# Patient Record
Sex: MALE | Race: OTHER
[De-identification: names, ages, dates, MRNs, and addresses within clinical notes are randomized per-mention and may not be internally consistent; named-entity substitution may affect disease eponyms.]

---

## 2022-10-02 ENCOUNTER — HOSPITAL ENCOUNTER (INPATIENT)
Dept: HOSPITAL 93 - ER | Age: 82
LOS: 9 days | Discharge: HOME | DRG: 202 | End: 2022-10-11
Attending: INTERNAL MEDICINE | Admitting: INTERNAL MEDICINE
Payer: COMMERCIAL

## 2022-10-02 VITALS — HEIGHT: 62 IN | BODY MASS INDEX: 27.23 KG/M2 | WEIGHT: 148 LBS

## 2022-10-02 DIAGNOSIS — I50.33: ICD-10-CM

## 2022-10-02 DIAGNOSIS — R06.02: ICD-10-CM

## 2022-10-02 DIAGNOSIS — I16.0: ICD-10-CM

## 2022-10-02 DIAGNOSIS — I11.0: ICD-10-CM

## 2022-10-02 DIAGNOSIS — R09.02: ICD-10-CM

## 2022-10-02 DIAGNOSIS — J44.0: ICD-10-CM

## 2022-10-02 DIAGNOSIS — J20.9: Primary | ICD-10-CM

## 2022-10-02 NOTE — NUR
PTE ALERTA Y ORIENTADO EN KATHRYN LUCY ESFERAS, LLEGA AMBULANDO EN COMPANIA DE
FAMILIAR Y REFIEREN PRESION ARTERIAL ELEVADA Y FALTA DE AIRE. AL MOMENTO B/P
MANUAL 158/80 Y SATURANDO 97%. SE UBICA EN CYDNEY DE ESPERA.

## 2022-10-02 NOTE — NUR
SE RECIBE PACIENTE A UNIDAD DE CHEST PAIN UNIT. ESTA ALERTA Y ORIENTADO X3. EN
H/P MANO DERECHA #20 Y MANO IZQUIERDA #22 JLOIE DE EDEMA, INFILTRACION Y
ENROJECIMIENTO. CAZARES COLOCADO A GRAVEDAD PRESENTANDO ORINA COLOR AGUA 1000 ML.
CANULA NASAL A 3 L/MIN. SE COLOCA TRIDIL A 3ML/HR Y SE ORIENTA PACIENTE SOBRE
LA UNIDAD Y MANAN REFIERE ENTENDER.

## 2022-10-03 PROCEDURE — B24BZZZ ULTRASONOGRAPHY OF HEART WITH AORTA: ICD-10-PCS | Performed by: GENERAL PRACTICE

## 2022-10-03 PROCEDURE — 4A12X4Z MONITORING OF CARDIAC ELECTRICAL ACTIVITY, EXTERNAL APPROACH: ICD-10-PCS | Performed by: INTERNAL MEDICINE

## 2022-10-07 PROCEDURE — BW24ZZZ COMPUTERIZED TOMOGRAPHY (CT SCAN) OF CHEST AND ABDOMEN: ICD-10-PCS | Performed by: INTERNAL MEDICINE

## 2025-06-05 ENCOUNTER — HOSPITAL ENCOUNTER (EMERGENCY)
Dept: HOSPITAL 93 - ER | Age: 85
Discharge: HOME | End: 2025-06-05
Payer: COMMERCIAL

## 2025-06-05 VITALS — HEIGHT: 62 IN | WEIGHT: 150 LBS | BODY MASS INDEX: 27.6 KG/M2

## 2025-06-05 DIAGNOSIS — R42: Primary | ICD-10-CM

## 2025-06-05 DIAGNOSIS — E78.00: ICD-10-CM

## 2025-06-05 DIAGNOSIS — I10: ICD-10-CM

## 2025-06-05 DIAGNOSIS — J45.909: ICD-10-CM

## 2025-06-05 LAB
APPEARANCE UR: CLEAR
BACTERIA UR QL AUTO: (no result)
BACTERIA UR QL AUTO: 2.4 UL (ref 0–1933)
BASOPHILS NFR BLD AUTO: 0.8 % (ref 0.1–1.2)
BASOPHILS NFR BLD: (no result) %
BILIRUB UR QL STRIP: NEGATIVE
BLASTS NFR BLD: (no result) %
CASTS # UR AUTO: 0 UL (ref 0–1.4)
COLOR UR: YELLOW
CRYSTALS UR QL: (no result) /HPF
EOSINOPHIL # BLD AUTO: 0.47 10*3/UL (ref 0.04–0.54)
EOSINOPHIL NFR BLD AUTO: 9.8 % (ref 0.7–7)
EOSINOPHIL NFR BLD: (no result) %
EPI CELLS URNS QL MICRO: (no result) /HPF
EPI CELLS URNS QL MICRO: 0.6 UL (ref 0–38.8)
ERYTHROCYTE [DISTWIDTH] IN BLOOD BY AUTOMATED COUNT: 14.5 % (ref 11.6–14.4)
GLUCOSE UR STRIP-MCNC: >=1000 MG/DL
HCT VFR BLD AUTO: 54.9 % (ref 40.1–51)
HGB BLD-MCNC: 18.3 G/DL (ref 13.7–17.5)
KETONES UR QL: NEGATIVE
LEUKOCYTE ESTERASE UR QL STRIP: NEGATIVE
LYMPHOCYTES # BLD AUTO: 1.2 10*3/UL (ref 1.18–3.74)
LYMPHOCYTES NFR BLD AUTO: 24.9 % (ref 19.3–53.1)
MCH RBC QN AUTO: 27.8 PG (ref 25.6–32.2)
METAMYELOCYTES NFR BLD: (no result) %
MONOCYTES # BLD AUTO: 0.58 10*3/UL (ref 0.24–0.82)
MONOCYTES NFR BLD AUTO: 12 % (ref 4.7–12.5)
MONOCYTES NFR BLD: (no result) %
MUCOUS THREADS #/AREA URNS LPF: (no result) /[LPF]
MYELOCYTES NFR BLD: (no result) %
NEUTROPHILS # BLD AUTO: 2.51 10*3/UL (ref 1.56–6.13)
NEUTROPHILS NFR BLD AUTO: 52.1 % (ref 34–71.1)
NEUTS BAND NFR BLD: (no result) %
NEUTS SEG NFR BLD: (no result) %
NITRITE UR STRIP-MCNC: NEGATIVE MG/DL
PH UR: (no result) [PH]
PH UR: 5.5 [PH] (ref 5–8)
PLASMACOID LYMPHS NFR BLD: (no result) %
PLATELET # BLD AUTO: 210 K/UL (ref 163–369)
PMV BLD AUTO: 9.3 FL (ref 9.4–12.4)
PROLYMPHOCYTES NFR BLD: (no result) %
PROT UR STRIP-MCNC: NEGATIVE MG/DL
RBC # BLD AUTO: 6.58 M/UL (ref 4.63–6.08)
RBC # UR STRIP: NEGATIVE /UL
RBC #/AREA URNS AUTO: (no result) /HPF
RBC #/AREA URNS AUTO: 0.7 UL (ref 0–20.8)
SP GR UR STRIP: 1.03 (ref 1–1.03)
SPERM URNS QL MICRO: (no result)
TRICHOMONAS #/AREA URNS HPF: (no result) /[HPF]
UROBILINOGEN UR STRIP-MCNC: 0.2 E.U./DL
WBC MORPH BLD: (no result)
WBC URNS QL MICRO: (no result) /HPF
WBC URNS QL MICRO: 1.4 UL (ref 0–23.2)
YEAST #/AREA URNS HPF: (no result) /HPF

## 2025-06-06 ENCOUNTER — HOSPITAL ENCOUNTER (OUTPATIENT)
Dept: HOSPITAL 93 - NUCLEAR | Age: 85
Discharge: HOME | End: 2025-06-06
Attending: EMERGENCY MEDICINE
Payer: COMMERCIAL

## 2025-06-06 DIAGNOSIS — R42: Primary | ICD-10-CM
